# Patient Record
Sex: MALE | URBAN - METROPOLITAN AREA
[De-identification: names, ages, dates, MRNs, and addresses within clinical notes are randomized per-mention and may not be internally consistent; named-entity substitution may affect disease eponyms.]

---

## 2017-03-21 ENCOUNTER — APPOINTMENT (RX ONLY)
Age: 47
Setting detail: DERMATOLOGY
End: 2017-03-21

## 2017-03-21 DIAGNOSIS — A63.0 ANOGENITAL (VENEREAL) WARTS: ICD-10-CM | Status: INADEQUATELY CONTROLLED

## 2017-03-21 DIAGNOSIS — L72.0 EPIDERMAL CYST: ICD-10-CM

## 2017-03-21 DIAGNOSIS — L28.1 PRURIGO NODULARIS: ICD-10-CM | Status: INADEQUATELY CONTROLLED

## 2017-03-21 DIAGNOSIS — L40.8 OTHER PSORIASIS: ICD-10-CM | Status: STABLE

## 2017-03-21 DIAGNOSIS — L40.0 PSORIASIS VULGARIS: ICD-10-CM | Status: INADEQUATELY CONTROLLED

## 2017-03-21 PROBLEM — I10 ESSENTIAL (PRIMARY) HYPERTENSION: Status: ACTIVE | Noted: 2017-03-21

## 2017-03-21 PROCEDURE — ? COUNSELING

## 2017-03-21 PROCEDURE — ? TREATMENT REGIMEN

## 2017-03-21 PROCEDURE — ? PATIENT SPECIFIC COUNSELING

## 2017-03-21 PROCEDURE — ? DIAGNOSIS COMMENT

## 2017-03-21 PROCEDURE — 99203 OFFICE O/P NEW LOW 30 MIN: CPT

## 2017-03-21 PROCEDURE — ? PRESCRIPTION

## 2017-03-21 RX ORDER — MOMETASONE FUROATE 1 MG/G
OINTMENT TOPICAL
Qty: 1 | Refills: 0 | Status: ERX | COMMUNITY
Start: 2017-03-21

## 2017-03-21 RX ORDER — DESOXIMETASONE 2.5 MG/ML
SPRAY TOPICAL
Qty: 1 | Refills: 1 | Status: ERX | COMMUNITY
Start: 2017-03-21

## 2017-03-21 RX ORDER — IMIQUIMOD 50 MG/G
CREAM TOPICAL TIW
Qty: 1 | Refills: 0 | Status: ERX | COMMUNITY
Start: 2017-03-21

## 2017-03-21 RX ADMIN — IMIQUIMOD: 50 CREAM TOPICAL at 21:52

## 2017-03-21 RX ADMIN — MOMETASONE FUROATE: 1 OINTMENT TOPICAL at 21:46

## 2017-03-21 RX ADMIN — DESOXIMETASONE: 2.5 SPRAY TOPICAL at 21:47

## 2017-03-21 ASSESSMENT — LOCATION DETAILED DESCRIPTION DERM
LOCATION DETAILED: LEFT MEDIAL PLANTAR MIDFOOT
LOCATION DETAILED: LEFT RADIAL PALM
LOCATION DETAILED: RIGHT RADIAL PALM
LOCATION DETAILED: RIGHT MEDIAL PLANTAR MIDFOOT
LOCATION DETAILED: LEFT SUPERIOR PARIETAL SCALP
LOCATION DETAILED: RIGHT MEDIAL SUPERIOR TARSAL REGION
LOCATION DETAILED: RIGHT MEDIAL DISTAL PRETIBIAL REGION
LOCATION DETAILED: LEFT DORSAL SHAFT OF PENIS

## 2017-03-21 ASSESSMENT — LOCATION SIMPLE DESCRIPTION DERM
LOCATION SIMPLE: LEFT HAND
LOCATION SIMPLE: RIGHT PLANTAR SURFACE
LOCATION SIMPLE: SCALP
LOCATION SIMPLE: PENIS
LOCATION SIMPLE: RIGHT MEDIAL SUPERIOR TARSAL REGION
LOCATION SIMPLE: LEFT PLANTAR SURFACE
LOCATION SIMPLE: RIGHT PRETIBIAL REGION
LOCATION SIMPLE: RIGHT HAND

## 2017-03-21 ASSESSMENT — LOCATION ZONE DERM
LOCATION ZONE: SCALP
LOCATION ZONE: FEET
LOCATION ZONE: HAND
LOCATION ZONE: EYELID
LOCATION ZONE: LEG
LOCATION ZONE: PENIS

## 2017-03-21 ASSESSMENT — SEVERITY ASSESSMENT: HOW SEVERE IS THIS PATIENT'S CONDITION?: MILD

## 2017-03-21 NOTE — PROCEDURE: TREATMENT REGIMEN
Action 2: Continue
Detail Level: Simple
Continue Regimen: Neutrogena T-gel shampoo
Otc Regimen: Excipial or Keralyt 5% cream for feet
Discontinue Regimen: Desoximetasone cream